# Patient Record
(demographics unavailable — no encounter records)

---

## 2025-03-06 NOTE — PLAN
[FreeTextEntry1] : Patient is a 49-year-old  3 para 3 last menstrual period 2025 Patient presents for evaluation after experiencing a vulvar cyst on the left labia that spontaneously ruptured approximately 2 days prior with passage of blood and purulent material Physical exam reveals a well-developed well-nourished female no apparent distress,, BMI 35 Pelvic exam shows evidence of inclusion cyst of the left labia majora and evidence of a cyst on the posterior aspect of the left of the labia consistent with a ruptured side and a abscess that spontaneously drained Minimal tenderness at the site Patient will be treated empirically with Flagyl and advised to use sitz bath's Follow-up 2 weeks to monitor response  Lou was present as a chaperone for the entire assessment and examination of this patient

## 2025-03-06 NOTE — PHYSICAL EXAM
[Chaperone Present] : A chaperone was present in the examining room during all aspects of the physical examination [Normal] : normal [FreeTextEntry2] : Small inclusion cyst of the left labia majora 1 over in the posterior aspect with evidence of rupture and minimal tenderness

## 2025-03-06 NOTE — HISTORY OF PRESENT ILLNESS
[FreeTextEntry1] : Patient is a 49-year-old  3 para 3 last menstrual period 2025 Patient presents for evaluation after experiencing a cyst on her vulva that spontaneously ruptured 2 days ago with passage of blood and pus